# Patient Record
Sex: FEMALE | Race: WHITE | URBAN - METROPOLITAN AREA
[De-identification: names, ages, dates, MRNs, and addresses within clinical notes are randomized per-mention and may not be internally consistent; named-entity substitution may affect disease eponyms.]

---

## 2020-07-24 ENCOUNTER — OFFICE VISIT - RIVER FALLS (OUTPATIENT)
Dept: FAMILY MEDICINE | Facility: CLINIC | Age: 25
End: 2020-07-24

## 2022-02-16 NOTE — NURSING NOTE
"Comprehensive Intake Entered On:  7/24/2020 10:52 AM CDT    Performed On:  7/24/2020 10:45 AM CDT by Heri Allan CMA               Summary   Chief Complaint :   Verbal consent given for a video visit.  Pt is c/o sore throat,sinus pressure and productive cough x 5-6 days.  Pt states she did have a Covid test done\"at [facility]\" on 7/22/20 due to possible exposure and was given \"negative results this morning.\"   Heri Allan CMA - 7/24/2020 10:45 AM CDT   Health Status   Allergies Verified? :   Yes   Medication History Verified? :   Yes   Medical History Verified? :   Yes   Pre-Visit Planning Status :   Not completed   Tobacco Use? :   Never smoker   Heri Allan CMA - 7/24/2020 10:45 AM CDT   Meds / Allergies   (As Of: 7/24/2020 10:52:29 AM CDT)   Allergies (Active)   No Known Medication Allergies  Estimated Onset Date:   Unspecified ; Created By:   Heri Allan CMA; Reaction Status:   Active ; Category:   Drug ; Substance:   No Known Medication Allergies ; Type:   Allergy ; Updated By:   Heri Allan CMA; Reviewed Date:   7/24/2020 10:49 AM CDT        Medication List   (As Of: 7/24/2020 10:52:29 AM CDT)   Home Meds    sulfamethoxazole-trimethoprim  :   sulfamethoxazole-trimethoprim ; Status:   Documented ; Ordered As Mnemonic:   sulfamethoxazole-trimethoprim ; Simple Display Line:   0 Refill(s) ; Catalog Code:   sulfamethoxazole-trimethoprim ; Order Dt/Tm:   7/24/2020 10:49:43 AM CDT          levonorgestrel  :   levonorgestrel ; Status:   Documented ; Ordered As Mnemonic:   Mirena 52 mg intrauterine device ; Simple Display Line:   52 mg, 1 EA, Intrauteral, once, 0 Refill(s) ; Catalog Code:   levonorgestrel ; Order Dt/Tm:   7/24/2020 10:49:11 AM CDT            ID Risk Screen   Recent Travel History :   No recent travel   Family Member Travel History :   No recent travel   Other Exposure to Infectious Disease :   Community exposure to COVID-19 within the last 14 days   Heri Allan CMA - 7/24/2020 " 10:45 AM CDT   Past Medical History   Past Medical History   (As Of: 7/24/2020 10:52:29 AM CDT)     Procedures / Surgeries        -    Procedure History   (As Of: 7/24/2020 10:52:29 AM CDT)     Anesthesia Minutes:   0 ; Procedure Name:   Extraction of wisdom tooth ; Procedure Minutes:   0 ; Last Reviewed Dt/Tm:   7/24/2020 10:50:26 AM CDT            Anesthesia Minutes:   0 ; Procedure Name:   Ts and As - Tonsillectomy and adenoidectomy ; Procedure Minutes:   0 ; Last Reviewed Dt/Tm:   7/24/2020 10:50:18 AM CDT            Family History   Family History   (As Of: 7/24/2020 10:52:29 AM CDT)     Social History   Social History   (As Of: 7/24/2020 10:52:29 AM CDT)   Tobacco:        Never (less than 100 in lifetime)   (Last Updated: 7/24/2020 10:50:02 AM CDT by Heri Allan CMA)

## 2022-02-16 NOTE — PROGRESS NOTES
"   Patient:   JJ DEE            MRN: [id number]            FIN: [id number]               Age:   24 years     Sex:  Female     :  1995   Associated Diagnoses:   Atypical pneumonia   Author:   Carlos NICOLE, Enrique UMANZOR      Visit Information      Date of Service: 2020 10:14 am  Performing Location: [facility]  Encounter#: [id number]      Primary Care Provider (PCP):  NONE ,    Visit type:  Video Visit via [employer] .    Participants in room during visit:  _1   Location of patient:  _home  Location of provider:  _ (Clinic office   Video Start Time:  _1101  Video End Time:   _1109    Today's visit was conducted via video conference due to the COVID-19 pandemic.  The patient's consent to proceed with a video visit has been obtained and documented.      Chief Complaint   2020 10:45 AM CDT   Verbal consent given for a video visit.  Pt is c/o sore throat,sinus pressure and productive cough x 5-6 days.  Pt states she did have a Covid test done\"at [facility]\" on 20 due to possible exposure and was given \"negative results this morning.\"        History of Present Illness   Patient is a _24 year old _female who is being evaluated via a billable video visit.  Chief complaint and symptoms noted above and confirmed with patient   6 day hx of sinus pressure, productive cough, sore throat  had Covid testing on ,  informed of negative results this morning  she has exercise-induced asthma  she is on Bactrim bid for acne per dermatologist, has been on that for 5 months    she has been using dayquil and nyquil and cough drops           Review of Systems   Constitutional:  No fever.    Ear/Nose/Mouth/Throat:  Sore throat, sinus pressure.    Respiratory:  Shortness of breath, Cough, Sputum production.       Health Status   Allergies:    Allergic Reactions (Selected)  No Known Medication Allergies   Medications:  (Selected)   Documented Medications  Documented  Mirena 52 mg intrauterine device: 1 " EA ( 52 mg ), Intrauteral, once, 0 Refill(s), Type: Maintenance  sulfamethoxazole-trimethoprim: 0 Refill(s), Type: Maintenance   Problem list:    No problem items selected or recorded.      Histories   Past Medical History:    No active or resolved past medical history items have been selected or recorded.   Family History:    No family history items have been selected or recorded.   Procedure history:    Ts and As - Tonsillectomy and adenoidectomy ([id number]).  Extraction of wisdom tooth ([id number]).   Social History:        Tobacco Assessment            Never (less than 100 in lifetime)        Physical Examination   General:  No acute distress.    Respiratory:  Respirations are non-labored.    Psychiatric:  Cooperative, Appropriate mood & affect, Normal judgment.       Impression and Plan   Diagnosis     Atypical pneumonia ([id number] J18.9).     Summary:  will treat with augmentin to cover both lungs and sinuses, continue with dayquil and nyquil, follow up if not improving.    Orders     Orders   Pharmacy:  Augmentin 875 mg-125 mg oral tablet (Prescribe): = 1 tab(s), Oral, q12 hrs, x 10 day(s), Instructions: with food or milk, # 20 tab(s), 0 Refill(s), Type: Acute, Pharmacy: [facility] #[id number], 1 tab(s) Oral q12 hrs,x10 day(s),Instr:with food or milk.     Orders   Charges (Evaluation and Management):  02532 office outpatient new 20 minutes (Charge) (Order): Quantity: 1, Atypical pneumonia.